# Patient Record
Sex: FEMALE | Race: WHITE
[De-identification: names, ages, dates, MRNs, and addresses within clinical notes are randomized per-mention and may not be internally consistent; named-entity substitution may affect disease eponyms.]

---

## 2022-05-19 ENCOUNTER — HOSPITAL ENCOUNTER (EMERGENCY)
Dept: HOSPITAL 4 - SED | Age: 72
Discharge: HOME | End: 2022-05-19
Payer: COMMERCIAL

## 2022-05-19 VITALS — HEIGHT: 62 IN | BODY MASS INDEX: 22.08 KG/M2 | WEIGHT: 120 LBS

## 2022-05-19 VITALS — SYSTOLIC BLOOD PRESSURE: 107 MMHG

## 2022-05-19 VITALS — SYSTOLIC BLOOD PRESSURE: 113 MMHG

## 2022-05-19 DIAGNOSIS — E16.2: Primary | ICD-10-CM

## 2022-05-19 DIAGNOSIS — R41.82: ICD-10-CM

## 2022-05-19 DIAGNOSIS — Z88.2: ICD-10-CM

## 2022-05-19 LAB
ALBUMIN SERPL BCP-MCNC: 2.7 G/DL (ref 3.4–4.8)
ALT SERPL W P-5'-P-CCNC: 14 U/L (ref 12–78)
ANION GAP SERPL CALCULATED.3IONS-SCNC: 11 MMOL/L (ref 5–15)
AST SERPL W P-5'-P-CCNC: 13 U/L (ref 10–37)
BASOPHILS # BLD AUTO: 0 K/UL (ref 0–0.2)
BASOPHILS NFR BLD AUTO: 0 % (ref 0–2)
BILIRUB SERPL-MCNC: 0.3 MG/DL (ref 0–1)
BUN SERPL-MCNC: 27 MG/DL (ref 8–21)
CALCIUM SERPL-MCNC: 8.3 MG/DL (ref 8.4–11)
CHLORIDE SERPL-SCNC: 105 MMOL/L (ref 98–107)
CREAT SERPL-MCNC: 1.61 MG/DL (ref 0.55–1.3)
EOSINOPHIL # BLD AUTO: 0 K/UL (ref 0–0.4)
EOSINOPHIL NFR BLD AUTO: 0.4 % (ref 0–4)
ERYTHROCYTE [DISTWIDTH] IN BLOOD BY AUTOMATED COUNT: 13.3 % (ref 9–15)
GFR SERPL CREATININE-BSD FRML MDRD: 41 ML/MIN (ref 90–?)
GLUCOSE SERPL-MCNC: 88 MG/DL (ref 70–99)
HCT VFR BLD AUTO: 26.6 % (ref 36–48)
HGB BLD-MCNC: 9 G/DL (ref 12–16)
LYMPHOCYTES # BLD AUTO: 0.1 K/UL (ref 1–5.5)
LYMPHOCYTES NFR BLD AUTO: 2.2 % (ref 20.5–51.5)
MCH RBC QN AUTO: 31 PG (ref 27–31)
MCHC RBC AUTO-ENTMCNC: 34 % (ref 32–36)
MCV RBC AUTO: 92 FL (ref 79–98)
MONOCYTES # BLD MANUAL: 0.1 K/UL (ref 0–1)
MONOCYTES # BLD MANUAL: 1.4 % (ref 1.7–9.3)
NEUTROPHILS # BLD AUTO: 5.6 K/UL (ref 1.8–7.7)
NEUTROPHILS NFR BLD AUTO: 96 % (ref 40–70)
PLATELET # BLD AUTO: 154 K/UL (ref 130–430)
POTASSIUM SERPL-SCNC: 3.6 MMOL/L (ref 3.5–5.1)
RBC # BLD AUTO: 2.89 MIL/UL (ref 4.2–6.2)
SODIUM SERPLBLD-SCNC: 139 MMOL/L (ref 136–145)
WBC # BLD AUTO: 5.8 K/UL (ref 4.8–10.8)

## 2022-05-19 RX ADMIN — DEXTROSE MONOHYDRATE ONE ML: 25 INJECTION, SOLUTION INTRAVENOUS at 01:14

## 2022-05-19 RX ADMIN — DEXTROSE AND SODIUM CHLORIDE ONE MLS/HR: 5; 450 INJECTION, SOLUTION INTRAVENOUS at 00:58

## 2022-05-19 NOTE — NUR
DTR AT BEDSIDE, PT AAO X3, IN NAD. RESP EVEN AND UNLABORED, ON RA @98%. PT'S 
[id number]. DR PINEDA AT BEDSIDE TO TALK WITH FAMILY. INSTRUCTIONS ON 
INSULIN GIVEN, VERBALIZED UNDERSTANDING.

## 2022-05-19 NOTE — NUR
LAT ENTRY:

PT BIBA FROM HOME FOR HYPOGLYCEMIA, 53MG/DL PER PARAMEDICS. THEY REPORT DTR 
GAVE INSULIN AT HOME-UNKNOWN AMOUNT AND CAROLE CERDA DTR GAVE IT. PT CURRENT 
AAOX3, IN NAD. BS-59MG. NO IV ACCESS, ORANGE JUICE GIVEN. ATTEMPTED TO ACCESS 
LEFT UPPER CHEST PORTACATH, BUT NO BLOOD RETURN, ATTEMPTED AGAIN BY ROQUE, 
NO BLD RETURN. I PROCEEDED TO INSERT IV #22 TO RT UPPER CHEST, GOOD BLOOD 
RETURN AND FLUSHED WELL. MEDICATED AS ORDERED, BS-NOW AT 89MG/DL. SAFETY 
MEASURES IN PLACE, WILL CONT TO MONITOR.

## 2022-05-19 NOTE — NUR
Placed in room 3  . Placed on cardiac monitor, blood pressure machine and pulse 
oximeter. To gown for exam. Side rails up.

Report given to Niecy MUNOZ(kitty).

## 2022-05-19 NOTE — NUR
Patient given written and verbal discharge instructions and verbalizes 
understanding.  ER MD discussed with patient the results and treatment 
provided. Patient in stable condition. ID arm band removed. IV catheter removed 
intact and dressing applied, no active bleeding.

 Patient educated on pain management and to follow up with PMD. Pain Scale .

Opportunity for questions provided and answered. Medication side effect fact 
sheet provided.

## 2022-05-19 NOTE — NUR
PT RESTING WITH EYES CLOSED, SNORING. IN NAD. RESP EVEN AND UNLABORD, IV FLUIDS 
INFUSING WELL AS ORDERED. BS- CHECKED, 224MG/DL .DR PINEDA INFORMED. IV 
FLUIDS STOPPED, VU RECHECK BS IN AN HOUR. PT INFORMED OF PLAN OF CARE.